# Patient Record
Sex: FEMALE | Race: BLACK OR AFRICAN AMERICAN | NOT HISPANIC OR LATINO | Employment: FULL TIME | URBAN - METROPOLITAN AREA
[De-identification: names, ages, dates, MRNs, and addresses within clinical notes are randomized per-mention and may not be internally consistent; named-entity substitution may affect disease eponyms.]

---

## 2021-09-27 ENCOUNTER — TRANSCRIBE ORDERS (OUTPATIENT)
Dept: ADMINISTRATIVE | Facility: HOSPITAL | Age: 61
End: 2021-09-27

## 2021-09-27 ENCOUNTER — HOSPITAL ENCOUNTER (OUTPATIENT)
Dept: CARDIOLOGY | Facility: HOSPITAL | Age: 61
Discharge: HOME | End: 2021-09-27

## 2021-09-27 ENCOUNTER — LAB (OUTPATIENT)
Dept: LAB | Facility: HOSPITAL | Age: 61
End: 2021-09-27

## 2021-09-27 ENCOUNTER — TRANSCRIBE ORDERS (OUTPATIENT)
Dept: CARDIOLOGY | Facility: HOSPITAL | Age: 61
End: 2021-09-27

## 2021-09-27 DIAGNOSIS — M16.11 PRIMARY OSTEOARTHRITIS OF RIGHT HIP: Primary | ICD-10-CM

## 2021-09-27 DIAGNOSIS — Z01.811 PRE-OP CHEST EXAM: Primary | ICD-10-CM

## 2021-09-27 DIAGNOSIS — M16.11 PRIMARY OSTEOARTHRITIS OF RIGHT HIP: ICD-10-CM

## 2021-09-27 LAB
ANION GAP SERPL CALCULATED.3IONS-SCNC: 10.7 MMOL/L (ref 5–15)
BASOPHILS # BLD AUTO: 0.04 10*3/MM3 (ref 0–0.2)
BASOPHILS NFR BLD AUTO: 0.6 % (ref 0–1.5)
BUN SERPL-MCNC: 13 MG/DL (ref 8–23)
BUN/CREAT SERPL: 13.1 (ref 7–25)
CALCIUM SPEC-SCNC: 9.7 MG/DL (ref 8.6–10.5)
CHLORIDE SERPL-SCNC: 105 MMOL/L (ref 98–107)
CO2 SERPL-SCNC: 23.3 MMOL/L (ref 22–29)
CREAT SERPL-MCNC: 0.99 MG/DL (ref 0.57–1)
DEPRECATED RDW RBC AUTO: 46.1 FL (ref 37–54)
EOSINOPHIL # BLD AUTO: 0.18 10*3/MM3 (ref 0–0.4)
EOSINOPHIL NFR BLD AUTO: 2.9 % (ref 0.3–6.2)
ERYTHROCYTE [DISTWIDTH] IN BLOOD BY AUTOMATED COUNT: 13.2 % (ref 12.3–15.4)
GFR SERPL CREATININE-BSD FRML MDRD: 69 ML/MIN/1.73
GLUCOSE SERPL-MCNC: 87 MG/DL (ref 65–99)
HCT VFR BLD AUTO: 46.6 % (ref 34–46.6)
HGB BLD-MCNC: 15.5 G/DL (ref 12–15.9)
LYMPHOCYTES # BLD AUTO: 2.32 10*3/MM3 (ref 0.7–3.1)
LYMPHOCYTES NFR BLD AUTO: 37.2 % (ref 19.6–45.3)
MCH RBC QN AUTO: 31.4 PG (ref 26.6–33)
MCHC RBC AUTO-ENTMCNC: 33.3 G/DL (ref 31.5–35.7)
MCV RBC AUTO: 94.3 FL (ref 79–97)
MONOCYTES # BLD AUTO: 0.46 10*3/MM3 (ref 0.1–0.9)
MONOCYTES NFR BLD AUTO: 7.4 % (ref 5–12)
NEUTROPHILS NFR BLD AUTO: 3.22 10*3/MM3 (ref 1.7–7)
NEUTROPHILS NFR BLD AUTO: 51.6 % (ref 42.7–76)
PLATELET # BLD AUTO: 125 10*3/MM3 (ref 140–450)
PMV BLD AUTO: 11.8 FL (ref 6–12)
POTASSIUM SERPL-SCNC: 3.4 MMOL/L (ref 3.5–5.2)
QT INTERVAL: 450 MS
RBC # BLD AUTO: 4.94 10*6/MM3 (ref 3.77–5.28)
SODIUM SERPL-SCNC: 139 MMOL/L (ref 136–145)
WBC # BLD AUTO: 6.24 10*3/MM3 (ref 3.4–10.8)

## 2021-09-27 PROCEDURE — 93010 ELECTROCARDIOGRAM REPORT: CPT | Performed by: INTERNAL MEDICINE

## 2021-09-27 PROCEDURE — 85025 COMPLETE CBC W/AUTO DIFF WBC: CPT

## 2021-09-27 PROCEDURE — 36415 COLL VENOUS BLD VENIPUNCTURE: CPT

## 2021-09-27 PROCEDURE — 80048 BASIC METABOLIC PNL TOTAL CA: CPT

## 2021-09-27 PROCEDURE — 93005 ELECTROCARDIOGRAM TRACING: CPT

## 2023-09-01 NOTE — PROGRESS NOTES
Subjective:  Cassia Orona is a 63 y.o. female who presents for       Patient Active Problem List   Diagnosis    Chronic midline low back pain    Chronic hepatitis C without hepatic coma    Essential hypertension    Therapeutic drug monitoring           Current Outpatient Medications:     baclofen (LIORESAL) 10 MG tablet, Take 1 tablet by mouth 3 (Three) Times a Day., Disp: , Rfl:     cyclobenzaprine (FLEXERIL) 10 MG tablet, Take 1 tablet by mouth., Disp: , Rfl:     hydroCHLOROthiazide (HYDRODIURIL) 12.5 MG tablet, Take 1 tablet by mouth Daily., Disp: , Rfl:     HYDROcodone-acetaminophen (NORCO) 7.5-325 MG per tablet, TAKE ONE TABLET BY MOUTH EVERY 6 HOURS AS NEEDED FOR PAIN. max DAILY dose4 TABLETS, Disp: , Rfl:     levothyroxine (SYNTHROID, LEVOTHROID) 100 MCG tablet, Take 1 tablet by mouth Daily., Disp: , Rfl:     Pt is 62 yo female with management of hypothyroidism, chronic hep C, HTN chronic back pain (sp ablation x3,  lumbar stenosis on right L3-L4 and L4-L5) right knee pain (osteoarthritis of right knee ) DM type 2, former tobacco smoker, sp total left hip arthroplasty, cardiac murmur     9/8/23 pt is here to establish. Pt is from [facility] to  [place] and lived there for 28 years but moved back here recently  She has a CMH of the above medical issues. She has history of DM type 2. She takes synthroid. She has history of Hep C and was treated for Hep C  in [place]. She is currently free of any infection. Her last US of abdomen was in 2019.   for back pain she was seeing [facility] nad was on hydrocodone and had ablation of back x 3 she has a history of DDD of lumbar spine and canal stenosis at L2-L3 and L3-L4. She has HTN and has been on HCTZ, lisinopril, and norvasc. Pt is a former tobacco smoker. She dose drink alcohol. No illicit drugs. She is single. Pt is due for colonoscopy, mammogram and pap smear . She has back pain when she moves around of sits too long. She used to [place]  [employer]. She smoked when she was 52 and quit at 54.  She does get short of breath on exertion and was seeing Cardiologist in the past . Per patient her heart was okay      Obesity  This is a chronic problem. The current episode started more than 1 year ago. The problem occurs constantly. The problem has been unchanged. Associated symptoms include arthralgias, fatigue, numbness and weakness. Pertinent negatives include no chest pain, chills, congestion, coughing, diaphoresis, fever, headaches, nausea, sore throat or vomiting. Nothing aggravates the symptoms. She has tried nothing for the symptoms. The treatment provided no relief.   Hypertension  This is a chronic problem. The current episode started more than 1 year ago. The problem is unchanged. The problem is controlled. Pertinent negatives include no anxiety, blurred vision, chest pain, headaches or shortness of breath. Risk factors for coronary artery disease include obesity, sedentary lifestyle, stress and post-menopausal state. Past treatments include diuretics. Current antihypertension treatment includes diuretics, calcium channel blockers and angiotensin blockers. The current treatment provides moderate improvement. There are no compliance problems.  There is no history of angina, kidney disease, CAD/MI, CVA, heart failure, left ventricular hypertrophy, PVD or retinopathy. Identifiable causes of hypertension include a thyroid problem. There is no history of chronic renal disease, coarctation of the aorta, hyperaldosteronism, hypercortisolism, hyperparathyroidism, a hypertension causing med, pheochromocytoma, renovascular disease or sleep apnea.   Hypothyroidism  Associated symptoms include arthralgias, fatigue, numbness and weakness. Pertinent negatives include no chest pain, chills, congestion, coughing, diaphoresis, fever, headaches, nausea, sore throat or vomiting.   Back Pain  This is a chronic problem. The current episode started more than  1 year ago. The problem occurs constantly. The problem has been gradually worsening since onset. The pain is present in the gluteal and lumbar spine. The quality of the pain is described as aching. The pain is at a severity of 5/10. The pain is moderate. The pain is The same all the time. The symptoms are aggravated by bending, lying down and position. Associated symptoms include numbness and weakness. Pertinent negatives include no chest pain, fever or headaches. Risk factors include obesity, sedentary lifestyle, poor posture and lack of exercise. Treatments tried: norco, baclofen, ablation x 3. The treatment provided moderate relief.     Review of Systems  Review of Systems   Constitutional:  Positive for activity change and fatigue. Negative for appetite change, chills, diaphoresis and fever.   HENT:  Negative for congestion, postnasal drip, rhinorrhea, sinus pressure, sinus pain, sneezing, sore throat, trouble swallowing and voice change.    Eyes:  Negative for blurred vision.   Respiratory:  Negative for cough, choking, chest tightness, shortness of breath, wheezing and stridor.    Cardiovascular:  Negative for chest pain.   Gastrointestinal:  Negative for diarrhea, nausea and vomiting.   Musculoskeletal:  Positive for arthralgias and back pain.   Neurological:  Positive for weakness and numbness. Negative for headaches.     Patient Active Problem List   Diagnosis    Chronic midline low back pain    Chronic hepatitis C without hepatic coma    Essential hypertension    Therapeutic drug monitoring     Past Surgical History:   Procedure Laterality Date    TOTAL HIP ARTHROPLASTY       Social History     Socioeconomic History    Marital status: Single   Tobacco Use    Smoking status: Former     Types: Cigarettes    Smokeless tobacco: Never   Vaping Use    Vaping Use: Never used   Substance and Sexual Activity    Alcohol use: Yes    Drug use: Never    Sexual activity: Defer     History reviewed. No pertinent family  history.  No visits with results within 6 Month(s) from this visit.   Latest known visit with results is:   Lab on 09/27/2021   Component Date Value Ref Range Status    Glucose 09/27/2021 87  65 - 99 mg/dL Final    BUN 09/27/2021 13  8 - 23 mg/dL Final    Creatinine 09/27/2021 0.99  0.57 - 1.00 mg/dL Final    Sodium 09/27/2021 139  136 - 145 mmol/L Final    Potassium 09/27/2021 3.4 (L)  3.5 - 5.2 mmol/L Final    Chloride 09/27/2021 105  98 - 107 mmol/L Final    CO2 09/27/2021 23.3  22.0 - 29.0 mmol/L Final    Calcium 09/27/2021 9.7  8.6 - 10.5 mg/dL Final    eGFR  African Amer 09/27/2021 69  >60 mL/min/1.73 Final    BUN/Creatinine Ratio 09/27/2021 13.1  7.0 - 25.0 Final    Anion Gap 09/27/2021 10.7  5.0 - 15.0 mmol/L Final    WBC 09/27/2021 6.24  3.40 - 10.80 10*3/mm3 Final    RBC 09/27/2021 4.94  3.77 - 5.28 10*6/mm3 Final    Hemoglobin 09/27/2021 15.5  12.0 - 15.9 g/dL Final    Hematocrit 09/27/2021 46.6  34.0 - 46.6 % Final    MCV 09/27/2021 94.3  79.0 - 97.0 fL Final    MCH 09/27/2021 31.4  26.6 - 33.0 pg Final    MCHC 09/27/2021 33.3  31.5 - 35.7 g/dL Final    RDW 09/27/2021 13.2  12.3 - 15.4 % Final    RDW-SD 09/27/2021 46.1  37.0 - 54.0 fl Final    MPV 09/27/2021 11.8  6.0 - 12.0 fL Final    Platelets 09/27/2021 125 (L)  140 - 450 10*3/mm3 Final    Neutrophil % 09/27/2021 51.6  42.7 - 76.0 % Final    Lymphocyte % 09/27/2021 37.2  19.6 - 45.3 % Final    Monocyte % 09/27/2021 7.4  5.0 - 12.0 % Final    Eosinophil % 09/27/2021 2.9  0.3 - 6.2 % Final    Basophil % 09/27/2021 0.6  0.0 - 1.5 % Final    Neutrophils, Absolute 09/27/2021 3.22  1.70 - 7.00 10*3/mm3 Final    Lymphocytes, Absolute 09/27/2021 2.32  0.70 - 3.10 10*3/mm3 Final    Monocytes, Absolute 09/27/2021 0.46  0.10 - 0.90 10*3/mm3 Final    Eosinophils, Absolute 09/27/2021 0.18  0.00 - 0.40 10*3/mm3 Final    Basophils, Absolute 09/27/2021 0.04  0.00 - 0.20 10*3/mm3 Final      No image results found.    [unfilled]  Immunization History   Administered  "Date(s) Administered    COVID-19 (PFIZER) BIVALENT 12+YRS 11/22/2022    COVID-19 (PFIZER) Purple Cap Monovalent 03/27/2021, 04/17/2021, 11/17/2021, 07/27/2022    COVID-19 (UNSPECIFIED) 04/15/2021    Covid-19 (Pfizer) Gray Cap Monovalent 07/27/2022    Flublok 18+yrs 11/17/2021    Fluzone >6mos 10/16/2017, 10/16/2018, 02/27/2020    Hep A / Hep B 12/19/2017, 01/19/2018, 06/20/2018    Influenza Injectable Mdck Pf Quad 09/11/2022    Influenza MDCK Quadrivalent with Preserative 10/15/2020    Pneumococcal Conjugate 13-Valent (PCV13) 10/15/2020    Pneumococcal Polysaccharide (PPSV23) 01/19/2018    Shingrix 09/11/2022, 12/11/2022    Tdap 10/16/2017       The following portions of the patient's history were reviewed and updated as appropriate: allergies, current medications, past family history, past medical history, past social history, past surgical history and problem list.        Physical Exam  /62 (BP Location: Right arm, Patient Position: Sitting, Cuff Size: Large Adult)   Pulse 70   Temp 98.3 °F (36.8 °C)   Ht 170.2 cm (67\")   Wt 107 kg (236 lb 9.6 oz)   SpO2 95%   BMI 37.06 kg/m²     Physical Exam  Vitals and nursing note reviewed.   Constitutional:       Appearance: She is well-developed. She is not diaphoretic.   HENT:      Head: Normocephalic and atraumatic.      Right Ear: External ear normal.   Eyes:      Conjunctiva/sclera: Conjunctivae normal.      Pupils: Pupils are equal, round, and reactive to light.   Cardiovascular:      Rate and Rhythm: Normal rate and regular rhythm.      Heart sounds: Murmur heard.   Pulmonary:      Effort: Pulmonary effort is normal. No respiratory distress.      Breath sounds: Normal breath sounds.   Abdominal:      General: Bowel sounds are normal. There is no distension.      Palpations: Abdomen is soft.      Tenderness: There is no abdominal tenderness.   Musculoskeletal:         General: Tenderness present. No deformity. Normal range of motion.      Cervical back: " Normal range of motion and neck supple.   Skin:     General: Skin is warm.      Coloration: Skin is not pale.      Findings: No erythema or rash.   Neurological:      Mental Status: She is alert and oriented to person, place, and time.      Cranial Nerves: No cranial nerve deficit.   Psychiatric:         Behavior: Behavior normal.       [unfilled]   Diagnosis Plan   1. Class 2 severe obesity due to excess calories with serious comorbidity and body mass index (BMI) of 37.0 to 37.9 in adult  CBC Auto Differential    Comprehensive Metabolic Panel    Hemoglobin A1c    Lipid Panel    TSH    T4, Free    Vitamin D,25-Hydroxy    Vitamin B12    Hepatitis C Antibody    Hepatitis C RNA, Quantitative, PCR (graph)    Ambulatory Referral to Gynecology    Ambulatory Referral For Screening Colonoscopy    Mammo Screening Bilateral With CAD      2. Encounter for screening for endocrine disorder  CBC Auto Differential    Comprehensive Metabolic Panel    Hemoglobin A1c    Lipid Panel    TSH    T4, Free    Vitamin D,25-Hydroxy    Vitamin B12    Hepatitis C Antibody    Hepatitis C RNA, Quantitative, PCR (graph)      3. Encounter for screening for diabetes mellitus  CBC Auto Differential    Comprehensive Metabolic Panel    Hemoglobin A1c    Lipid Panel    TSH    T4, Free    Vitamin D,25-Hydroxy    Vitamin B12    Hepatitis C Antibody    Hepatitis C RNA, Quantitative, PCR (graph)      4. Encounter for screening for cardiovascular disorders  CBC Auto Differential    Comprehensive Metabolic Panel    Hemoglobin A1c    Lipid Panel    TSH    T4, Free    Vitamin D,25-Hydroxy    Vitamin B12    Hepatitis C Antibody    Hepatitis C RNA, Quantitative, PCR (graph)      5. Hypothyroidism, unspecified type  CBC Auto Differential    Comprehensive Metabolic Panel    Hemoglobin A1c    Lipid Panel    TSH    T4, Free    Vitamin D,25-Hydroxy    Vitamin B12    Hepatitis C Antibody    Hepatitis C RNA, Quantitative, PCR (graph)      6. Need for hepatitis C  screening test  CBC Auto Differential    Comprehensive Metabolic Panel    Hemoglobin A1c    Lipid Panel    TSH    T4, Free    Vitamin D,25-Hydroxy    Vitamin B12    Hepatitis C Antibody    Hepatitis C RNA, Quantitative, PCR (graph)      7. Chronic midline low back pain, unspecified whether sciatica present  CBC Auto Differential    Comprehensive Metabolic Panel    Hemoglobin A1c    Lipid Panel    TSH    T4, Free    Vitamin D,25-Hydroxy    Vitamin B12    Hepatitis C Antibody    Hepatitis C RNA, Quantitative, PCR (graph)    Ambulatory Referral to Pain Management      8. Essential hypertension  CBC Auto Differential    Comprehensive Metabolic Panel    Hemoglobin A1c    Lipid Panel    TSH    T4, Free    Vitamin D,25-Hydroxy    Vitamin B12    Hepatitis C Antibody    Hepatitis C RNA, Quantitative, PCR (graph)      9. Chronic hepatitis C without hepatic coma  CBC Auto Differential    Comprehensive Metabolic Panel    Hemoglobin A1c    Lipid Panel    TSH    T4, Free    Vitamin D,25-Hydroxy    Vitamin B12    Hepatitis C Antibody    Hepatitis C RNA, Quantitative, PCR (graph)      10. Screening mammogram, encounter for  Mammo Screening Bilateral With CAD      11. Encounter for screening colonoscopy  Ambulatory Referral For Screening Colonoscopy      12. Encounter for Papanicolaou smear of cervix  Ambulatory Referral to Gynecology      13. Therapeutic drug monitoring  Urine Drug Screen - Urine, Clean Catch      14. Controlled type 2 diabetes mellitus without complication, without long-term current use of insulin  Microalbumin / Creatinine Urine Ratio - Urine, Clean Catch      15. DDD (degenerative disc disease), lumbar  Ambulatory Referral to Pain Management      16. Spinal stenosis of lumbar region, unspecified whether neurogenic claudication present  Ambulatory Referral to Pain Management      17. Dyspnea on exertion  BNP    XR Chest PA & Lateral           -recommend labwork  -recommend mammogram screening- schedule at imaging  center   -recommend colonoscopy screening- refer to GI at [facility]   -recommend pneumonia/shingles vaccination  -recommend COVID-19 vaccination  -recommend pap smear -refer to OB/GYN   -cardiac murmur/dyspnea on exertion - check  chest x-ray, BNP, possible heart failure.  Was seeing Cardiology in past.   -obesity - BMI at 37.06 counseled weight loss >5 minutes   -hyopthyroidism - on synthroid 100 mcg PO q daily   -HTN - has tried HCTZ, lisinopril and norvasc on HCTZ 12.5 mg daily.   -chronic hep C  - was seeing GI in [place]. Had treatment   -chronic back pain -was seeing PM.  Had ablation x 3 on Norco 7.5-/325 mg on flexeril 10 mg on baclofen 10 mg. Refer to PM. Will have pt sign drug contract   -advised pt to be safe and call with questions and concerns  -advised pt to go to ER or call 911 if symptoms worrisome or severe  -advised pt to followup with specialist and referrals  -advised pt to be safe during COVID-19 pandemic   I spent 47 minutes caring for Cassia on this date of service. This time includes time spent by me in the following activities: preparing for the visit, reviewing tests, obtaining and/or reviewing a separately obtained history, performing a medically appropriate examination and/or evaluation, counseling and educating the patient/family/caregiver, ordering medications, tests, or procedures, referring and communicating with other health care professionals, documenting information in the medical record, independently interpreting results and communicating that information with the patient/family/caregiver, and care coordination   -recheck in 2 months         This document has been electronically signed by Hiren Valentino MD on September 8, 2023 13:58 CDT                    This document has been electronically signed by Hiren Valentino MD on September 8, 2023 13:58 CDT

## 2023-09-08 ENCOUNTER — LAB (OUTPATIENT)
Dept: LAB | Facility: HOSPITAL | Age: 63
End: 2023-09-08
Payer: MEDICAID

## 2023-09-08 ENCOUNTER — OFFICE VISIT (OUTPATIENT)
Dept: FAMILY MEDICINE CLINIC | Facility: CLINIC | Age: 63
End: 2023-09-08
Payer: MEDICAID

## 2023-09-08 VITALS
SYSTOLIC BLOOD PRESSURE: 124 MMHG | HEART RATE: 70 BPM | TEMPERATURE: 98.3 F | BODY MASS INDEX: 37.13 KG/M2 | WEIGHT: 236.6 LBS | HEIGHT: 67 IN | OXYGEN SATURATION: 95 % | DIASTOLIC BLOOD PRESSURE: 62 MMHG

## 2023-09-08 DIAGNOSIS — B18.2 CHRONIC HEPATITIS C WITHOUT HEPATIC COMA: ICD-10-CM

## 2023-09-08 DIAGNOSIS — M48.061 SPINAL STENOSIS OF LUMBAR REGION, UNSPECIFIED WHETHER NEUROGENIC CLAUDICATION PRESENT: ICD-10-CM

## 2023-09-08 DIAGNOSIS — E11.9 CONTROLLED TYPE 2 DIABETES MELLITUS WITHOUT COMPLICATION, WITHOUT LONG-TERM CURRENT USE OF INSULIN: ICD-10-CM

## 2023-09-08 DIAGNOSIS — Z11.59 NEED FOR HEPATITIS C SCREENING TEST: ICD-10-CM

## 2023-09-08 DIAGNOSIS — Z13.29 ENCOUNTER FOR SCREENING FOR ENDOCRINE DISORDER: ICD-10-CM

## 2023-09-08 DIAGNOSIS — E03.9 HYPOTHYROIDISM, UNSPECIFIED TYPE: ICD-10-CM

## 2023-09-08 DIAGNOSIS — Z12.31 SCREENING MAMMOGRAM, ENCOUNTER FOR: ICD-10-CM

## 2023-09-08 DIAGNOSIS — Z12.4 ENCOUNTER FOR PAPANICOLAOU SMEAR OF CERVIX: ICD-10-CM

## 2023-09-08 DIAGNOSIS — Z13.1 ENCOUNTER FOR SCREENING FOR DIABETES MELLITUS: ICD-10-CM

## 2023-09-08 DIAGNOSIS — Z51.81 THERAPEUTIC DRUG MONITORING: ICD-10-CM

## 2023-09-08 DIAGNOSIS — Z12.11 ENCOUNTER FOR SCREENING COLONOSCOPY: ICD-10-CM

## 2023-09-08 DIAGNOSIS — I10 ESSENTIAL HYPERTENSION: ICD-10-CM

## 2023-09-08 DIAGNOSIS — E66.01 CLASS 2 SEVERE OBESITY DUE TO EXCESS CALORIES WITH SERIOUS COMORBIDITY AND BODY MASS INDEX (BMI) OF 37.0 TO 37.9 IN ADULT: Primary | ICD-10-CM

## 2023-09-08 DIAGNOSIS — M54.50 CHRONIC MIDLINE LOW BACK PAIN, UNSPECIFIED WHETHER SCIATICA PRESENT: ICD-10-CM

## 2023-09-08 DIAGNOSIS — G89.29 CHRONIC MIDLINE LOW BACK PAIN, UNSPECIFIED WHETHER SCIATICA PRESENT: ICD-10-CM

## 2023-09-08 DIAGNOSIS — R06.09 DYSPNEA ON EXERTION: ICD-10-CM

## 2023-09-08 DIAGNOSIS — Z13.6 ENCOUNTER FOR SCREENING FOR CARDIOVASCULAR DISORDERS: ICD-10-CM

## 2023-09-08 DIAGNOSIS — M51.36 DDD (DEGENERATIVE DISC DISEASE), LUMBAR: ICD-10-CM

## 2023-09-08 DIAGNOSIS — E66.01 CLASS 2 SEVERE OBESITY DUE TO EXCESS CALORIES WITH SERIOUS COMORBIDITY AND BODY MASS INDEX (BMI) OF 37.0 TO 37.9 IN ADULT: ICD-10-CM

## 2023-09-08 LAB
AMPHET+METHAMPHET UR QL: NEGATIVE
AMPHETAMINES UR QL: NEGATIVE
BARBITURATES UR QL SCN: NEGATIVE
BENZODIAZ UR QL SCN: NEGATIVE
BUPRENORPHINE SERPL-MCNC: NEGATIVE NG/ML
CANNABINOIDS SERPL QL: NEGATIVE
COCAINE UR QL: NEGATIVE
FENTANYL UR-MCNC: NEGATIVE NG/ML
METHADONE UR QL SCN: NEGATIVE
OPIATES UR QL: POSITIVE
OXYCODONE UR QL SCN: NEGATIVE
PCP UR QL SCN: NEGATIVE
PROPOXYPH UR QL: NEGATIVE
TRICYCLICS UR QL SCN: NEGATIVE

## 2023-09-08 PROCEDURE — 82043 UR ALBUMIN QUANTITATIVE: CPT

## 2023-09-08 PROCEDURE — 82570 ASSAY OF URINE CREATININE: CPT

## 2023-09-08 PROCEDURE — 86803 HEPATITIS C AB TEST: CPT

## 2023-09-08 PROCEDURE — 84439 ASSAY OF FREE THYROXINE: CPT

## 2023-09-08 PROCEDURE — 87522 HEPATITIS C REVRS TRNSCRPJ: CPT

## 2023-09-08 PROCEDURE — 83880 ASSAY OF NATRIURETIC PEPTIDE: CPT

## 2023-09-08 PROCEDURE — 80061 LIPID PANEL: CPT

## 2023-09-08 PROCEDURE — 82607 VITAMIN B-12: CPT

## 2023-09-08 PROCEDURE — 80050 GENERAL HEALTH PANEL: CPT

## 2023-09-08 PROCEDURE — 82306 VITAMIN D 25 HYDROXY: CPT

## 2023-09-08 PROCEDURE — 83036 HEMOGLOBIN GLYCOSYLATED A1C: CPT

## 2023-09-08 PROCEDURE — 80307 DRUG TEST PRSMV CHEM ANLYZR: CPT

## 2023-09-08 RX ORDER — HYDROCHLOROTHIAZIDE 12.5 MG/1
1 TABLET ORAL DAILY
COMMUNITY
Start: 2023-05-08

## 2023-09-08 RX ORDER — LEVOTHYROXINE SODIUM 0.1 MG/1
1 TABLET ORAL DAILY
COMMUNITY
Start: 2023-03-18

## 2023-09-08 RX ORDER — HYDROCODONE BITARTRATE AND ACETAMINOPHEN 7.5; 325 MG/1; MG/1
TABLET ORAL
COMMUNITY

## 2023-09-08 RX ORDER — BACLOFEN 10 MG/1
10 TABLET ORAL 3 TIMES DAILY
COMMUNITY

## 2023-09-08 RX ORDER — CYCLOBENZAPRINE HCL 10 MG
10 TABLET ORAL
COMMUNITY

## 2023-09-08 NOTE — PATIENT INSTRUCTIONS
Please get labwork fasting    Will get mammogram screening [facility]    Will refer to OB/GYN for pap smear [facility] [place]    Will refer to [facility] for colonoscopy screening     Will refer to Pain Management in [place]     Cut back on salt intake less than 1500 mg daily    Keep water intake to less than 2 liters a day    Concerned for possible heart failure    Will get chest x-ray and labowrk

## 2023-09-09 LAB
25(OH)D3 SERPL-MCNC: 9.7 NG/ML (ref 30–100)
ALBUMIN SERPL-MCNC: 4.3 G/DL (ref 3.5–5.2)
ALBUMIN UR-MCNC: 1.9 MG/DL
ALBUMIN/GLOB SERPL: 1.1 G/DL
ALP SERPL-CCNC: 106 U/L (ref 39–117)
ALT SERPL W P-5'-P-CCNC: 12 U/L (ref 1–33)
ANION GAP SERPL CALCULATED.3IONS-SCNC: 15 MMOL/L (ref 5–15)
AST SERPL-CCNC: 24 U/L (ref 1–32)
BASOPHILS # BLD AUTO: 0.06 10*3/MM3 (ref 0–0.2)
BASOPHILS NFR BLD AUTO: 1.1 % (ref 0–1.5)
BILIRUB SERPL-MCNC: 0.8 MG/DL (ref 0–1.2)
BUN SERPL-MCNC: 14 MG/DL (ref 8–23)
BUN/CREAT SERPL: 13.9 (ref 7–25)
CALCIUM SPEC-SCNC: 9.6 MG/DL (ref 8.6–10.5)
CHLORIDE SERPL-SCNC: 105 MMOL/L (ref 98–107)
CHOLEST SERPL-MCNC: 175 MG/DL (ref 0–200)
CO2 SERPL-SCNC: 20 MMOL/L (ref 22–29)
CREAT SERPL-MCNC: 1.01 MG/DL (ref 0.57–1)
CREAT UR-MCNC: 300.5 MG/DL
DEPRECATED RDW RBC AUTO: 44.6 FL (ref 37–54)
EGFRCR SERPLBLD CKD-EPI 2021: 62.3 ML/MIN/1.73
EOSINOPHIL # BLD AUTO: 0.17 10*3/MM3 (ref 0–0.4)
EOSINOPHIL NFR BLD AUTO: 3 % (ref 0.3–6.2)
ERYTHROCYTE [DISTWIDTH] IN BLOOD BY AUTOMATED COUNT: 13.7 % (ref 12.3–15.4)
GLOBULIN UR ELPH-MCNC: 3.8 GM/DL
GLUCOSE SERPL-MCNC: 92 MG/DL (ref 65–99)
HBA1C MFR BLD: 5.3 % (ref 4.8–5.6)
HCT VFR BLD AUTO: 43.3 % (ref 34–46.6)
HCV AB SER DONR QL: REACTIVE
HDLC SERPL-MCNC: 53 MG/DL (ref 40–60)
HGB BLD-MCNC: 15.3 G/DL (ref 12–15.9)
IMM GRANULOCYTES # BLD AUTO: 0.02 10*3/MM3 (ref 0–0.05)
IMM GRANULOCYTES NFR BLD AUTO: 0.4 % (ref 0–0.5)
LDLC SERPL CALC-MCNC: 102 MG/DL (ref 0–100)
LDLC/HDLC SERPL: 1.88 {RATIO}
LYMPHOCYTES # BLD AUTO: 1.91 10*3/MM3 (ref 0.7–3.1)
LYMPHOCYTES NFR BLD AUTO: 33.5 % (ref 19.6–45.3)
MCH RBC QN AUTO: 31.8 PG (ref 26.6–33)
MCHC RBC AUTO-ENTMCNC: 35.3 G/DL (ref 31.5–35.7)
MCV RBC AUTO: 90 FL (ref 79–97)
MICROALBUMIN/CREAT UR: 6.3 MG/G
MONOCYTES # BLD AUTO: 0.43 10*3/MM3 (ref 0.1–0.9)
MONOCYTES NFR BLD AUTO: 7.5 % (ref 5–12)
NEUTROPHILS NFR BLD AUTO: 3.11 10*3/MM3 (ref 1.7–7)
NEUTROPHILS NFR BLD AUTO: 54.5 % (ref 42.7–76)
NRBC BLD AUTO-RTO: 0 /100 WBC (ref 0–0.2)
NT-PROBNP SERPL-MCNC: 163 PG/ML (ref 0–900)
PLATELET # BLD AUTO: 139 10*3/MM3 (ref 140–450)
PMV BLD AUTO: 12.4 FL (ref 6–12)
POTASSIUM SERPL-SCNC: 3.7 MMOL/L (ref 3.5–5.2)
PROT SERPL-MCNC: 8.1 G/DL (ref 6–8.5)
RBC # BLD AUTO: 4.81 10*6/MM3 (ref 3.77–5.28)
SODIUM SERPL-SCNC: 140 MMOL/L (ref 136–145)
T4 FREE SERPL-MCNC: 1.52 NG/DL (ref 0.93–1.7)
TRIGL SERPL-MCNC: 112 MG/DL (ref 0–150)
TSH SERPL DL<=0.05 MIU/L-ACNC: 3.92 UIU/ML (ref 0.27–4.2)
VIT B12 BLD-MCNC: 534 PG/ML (ref 211–946)
VLDLC SERPL-MCNC: 20 MG/DL (ref 5–40)
WBC NRBC COR # BLD: 5.7 10*3/MM3 (ref 3.4–10.8)

## 2023-09-11 RX ORDER — ERGOCALCIFEROL 1.25 MG/1
50000 CAPSULE ORAL WEEKLY
Qty: 5 CAPSULE | Refills: 3 | Status: SHIPPED | OUTPATIENT
Start: 2023-09-11

## 2023-09-11 RX ORDER — FUROSEMIDE 20 MG/1
20 TABLET ORAL 2 TIMES DAILY
Qty: 60 TABLET | Refills: 3 | Status: SHIPPED | OUTPATIENT
Start: 2023-09-11

## 2023-09-11 NOTE — TELEPHONE ENCOUNTER
----- Message from Hiren Valentino MD sent at 9/8/2023  3:10 PM CDT -----  Pt has interstitial markings which could be fluid in lungs. Will assess for possible heart failure on labwork    Recommend pt start on lasix 20 mg PO BID to help with breathing. Give 60 pills and 3 refills thanks

## 2023-09-11 NOTE — TELEPHONE ENCOUNTER
----- Message from Hiren Valentino MD sent at 9/9/2023 11:40 AM CDT -----  Pt does have hep C antibodies consistent with history of Hep C infection    Vitamin D is low and recommend pt start on vitamin D3 50,000 units once a week give 4 pills and 3 refills     On lipid panel LDL high at 102 recommend heart healthy diet     On CMP kidney function shows CR at 1.01 and GFR at 62.3. pt likely has CKD stage 2. Continue to monitor    Thyroid studies stable     BNP is normal range. Pt not likely in heart failure     Awaiting Hep C antibody test     Recheck on next visit

## 2023-09-12 LAB
HCV RNA SERPL NAA+PROBE-ACNC: NORMAL IU/ML
TEST INFORMATION: NORMAL